# Patient Record
Sex: FEMALE | Race: WHITE | NOT HISPANIC OR LATINO | URBAN - METROPOLITAN AREA
[De-identification: names, ages, dates, MRNs, and addresses within clinical notes are randomized per-mention and may not be internally consistent; named-entity substitution may affect disease eponyms.]

---

## 2020-05-27 ENCOUNTER — INPATIENT (INPATIENT)
Facility: HOSPITAL | Age: 55
LOS: 0 days | Discharge: LEFT BEFORE DISCHARGE | End: 2020-05-28
Attending: HOSPITALIST | Admitting: HOSPITALIST
Payer: COMMERCIAL

## 2020-05-27 VITALS
HEIGHT: 59 IN | SYSTOLIC BLOOD PRESSURE: 126 MMHG | WEIGHT: 145.06 LBS | OXYGEN SATURATION: 99 % | TEMPERATURE: 97 F | RESPIRATION RATE: 18 BRPM | HEART RATE: 111 BPM | DIASTOLIC BLOOD PRESSURE: 65 MMHG

## 2020-05-27 DIAGNOSIS — Z98.891 HISTORY OF UTERINE SCAR FROM PREVIOUS SURGERY: Chronic | ICD-10-CM

## 2020-05-27 LAB
ALBUMIN SERPL ELPH-MCNC: 4.7 G/DL — SIGNIFICANT CHANGE UP (ref 3.5–5.2)
ALP SERPL-CCNC: 76 U/L — SIGNIFICANT CHANGE UP (ref 30–115)
ALT FLD-CCNC: 17 U/L — SIGNIFICANT CHANGE UP (ref 0–41)
ANION GAP SERPL CALC-SCNC: 14 MMOL/L — SIGNIFICANT CHANGE UP (ref 7–14)
AST SERPL-CCNC: 20 U/L — SIGNIFICANT CHANGE UP (ref 0–41)
BASOPHILS # BLD AUTO: 0.04 K/UL — SIGNIFICANT CHANGE UP (ref 0–0.2)
BASOPHILS NFR BLD AUTO: 0.5 % — SIGNIFICANT CHANGE UP (ref 0–1)
BILIRUB SERPL-MCNC: 0.2 MG/DL — SIGNIFICANT CHANGE UP (ref 0.2–1.2)
BUN SERPL-MCNC: 20 MG/DL — SIGNIFICANT CHANGE UP (ref 10–20)
CALCIUM SERPL-MCNC: 10 MG/DL — SIGNIFICANT CHANGE UP (ref 8.5–10.1)
CHLORIDE SERPL-SCNC: 105 MMOL/L — SIGNIFICANT CHANGE UP (ref 98–110)
CO2 SERPL-SCNC: 22 MMOL/L — SIGNIFICANT CHANGE UP (ref 17–32)
CREAT SERPL-MCNC: 0.8 MG/DL — SIGNIFICANT CHANGE UP (ref 0.7–1.5)
EOSINOPHIL # BLD AUTO: 0.16 K/UL — SIGNIFICANT CHANGE UP (ref 0–0.7)
EOSINOPHIL NFR BLD AUTO: 2 % — SIGNIFICANT CHANGE UP (ref 0–8)
GLUCOSE SERPL-MCNC: 97 MG/DL — SIGNIFICANT CHANGE UP (ref 70–99)
HCT VFR BLD CALC: 42.1 % — SIGNIFICANT CHANGE UP (ref 37–47)
HGB BLD-MCNC: 14.7 G/DL — SIGNIFICANT CHANGE UP (ref 12–16)
IMM GRANULOCYTES NFR BLD AUTO: 0.4 % — HIGH (ref 0.1–0.3)
LYMPHOCYTES # BLD AUTO: 1.75 K/UL — SIGNIFICANT CHANGE UP (ref 1.2–3.4)
LYMPHOCYTES # BLD AUTO: 22.3 % — SIGNIFICANT CHANGE UP (ref 20.5–51.1)
MAGNESIUM SERPL-MCNC: 2.4 MG/DL — SIGNIFICANT CHANGE UP (ref 1.8–2.4)
MCHC RBC-ENTMCNC: 32.5 PG — HIGH (ref 27–31)
MCHC RBC-ENTMCNC: 34.9 G/DL — SIGNIFICANT CHANGE UP (ref 32–37)
MCV RBC AUTO: 92.9 FL — SIGNIFICANT CHANGE UP (ref 81–99)
MONOCYTES # BLD AUTO: 0.43 K/UL — SIGNIFICANT CHANGE UP (ref 0.1–0.6)
MONOCYTES NFR BLD AUTO: 5.5 % — SIGNIFICANT CHANGE UP (ref 1.7–9.3)
NEUTROPHILS # BLD AUTO: 5.43 K/UL — SIGNIFICANT CHANGE UP (ref 1.4–6.5)
NEUTROPHILS NFR BLD AUTO: 69.3 % — SIGNIFICANT CHANGE UP (ref 42.2–75.2)
NRBC # BLD: 0 /100 WBCS — SIGNIFICANT CHANGE UP (ref 0–0)
NT-PROBNP SERPL-SCNC: 135 PG/ML — SIGNIFICANT CHANGE UP (ref 0–300)
PLATELET # BLD AUTO: 323 K/UL — SIGNIFICANT CHANGE UP (ref 130–400)
POTASSIUM SERPL-MCNC: 4.7 MMOL/L — SIGNIFICANT CHANGE UP (ref 3.5–5)
POTASSIUM SERPL-SCNC: 4.7 MMOL/L — SIGNIFICANT CHANGE UP (ref 3.5–5)
PROT SERPL-MCNC: 7.2 G/DL — SIGNIFICANT CHANGE UP (ref 6–8)
RBC # BLD: 4.53 M/UL — SIGNIFICANT CHANGE UP (ref 4.2–5.4)
RBC # FLD: 11.4 % — LOW (ref 11.5–14.5)
SODIUM SERPL-SCNC: 141 MMOL/L — SIGNIFICANT CHANGE UP (ref 135–146)
TROPONIN T SERPL-MCNC: <0.01 NG/ML — SIGNIFICANT CHANGE UP
WBC # BLD: 7.84 K/UL — SIGNIFICANT CHANGE UP (ref 4.8–10.8)
WBC # FLD AUTO: 7.84 K/UL — SIGNIFICANT CHANGE UP (ref 4.8–10.8)

## 2020-05-27 PROCEDURE — 93010 ELECTROCARDIOGRAM REPORT: CPT

## 2020-05-27 PROCEDURE — 71046 X-RAY EXAM CHEST 2 VIEWS: CPT | Mod: 26

## 2020-05-27 PROCEDURE — 99223 1ST HOSP IP/OBS HIGH 75: CPT | Mod: AI

## 2020-05-27 PROCEDURE — 99285 EMERGENCY DEPT VISIT HI MDM: CPT

## 2020-05-27 RX ORDER — ACETAMINOPHEN 500 MG
650 TABLET ORAL EVERY 6 HOURS
Refills: 0 | Status: DISCONTINUED | OUTPATIENT
Start: 2020-05-27 | End: 2020-05-28

## 2020-05-27 RX ORDER — CHLORHEXIDINE GLUCONATE 213 G/1000ML
1 SOLUTION TOPICAL
Refills: 0 | Status: DISCONTINUED | OUTPATIENT
Start: 2020-05-27 | End: 2020-05-28

## 2020-05-27 RX ORDER — METHYLPHENIDATE HCL 5 MG
0 TABLET ORAL
Qty: 0 | Refills: 0 | DISCHARGE

## 2020-05-27 RX ORDER — LEVALBUTEROL 1.25 MG/.5ML
2 SOLUTION, CONCENTRATE RESPIRATORY (INHALATION)
Qty: 0 | Refills: 0 | DISCHARGE

## 2020-05-27 RX ORDER — VENLAFAXINE HCL 75 MG
150 CAPSULE, EXT RELEASE 24 HR ORAL
Qty: 0 | Refills: 0 | DISCHARGE

## 2020-05-27 RX ORDER — MAGNESIUM SULFATE 500 MG/ML
2 VIAL (ML) INJECTION ONCE
Refills: 0 | Status: COMPLETED | OUTPATIENT
Start: 2020-05-27 | End: 2020-05-27

## 2020-05-27 RX ORDER — VENLAFAXINE HCL 75 MG
0 CAPSULE, EXT RELEASE 24 HR ORAL
Qty: 0 | Refills: 0 | DISCHARGE

## 2020-05-27 RX ORDER — VENLAFAXINE HCL 75 MG
150 CAPSULE, EXT RELEASE 24 HR ORAL
Refills: 0 | Status: DISCONTINUED | OUTPATIENT
Start: 2020-05-27 | End: 2020-05-28

## 2020-05-27 RX ORDER — METHYLPHENIDATE HCL 5 MG
1 TABLET ORAL
Qty: 0 | Refills: 0 | DISCHARGE

## 2020-05-27 RX ORDER — ENOXAPARIN SODIUM 100 MG/ML
40 INJECTION SUBCUTANEOUS AT BEDTIME
Refills: 0 | Status: DISCONTINUED | OUTPATIENT
Start: 2020-05-27 | End: 2020-05-28

## 2020-05-27 RX ORDER — VENLAFAXINE HCL 75 MG
150 CAPSULE, EXT RELEASE 24 HR ORAL
Refills: 0 | Status: DISCONTINUED | OUTPATIENT
Start: 2020-05-27 | End: 2020-05-27

## 2020-05-27 RX ORDER — SODIUM CHLORIDE 9 MG/ML
1000 INJECTION INTRAMUSCULAR; INTRAVENOUS; SUBCUTANEOUS ONCE
Refills: 0 | Status: COMPLETED | OUTPATIENT
Start: 2020-05-27 | End: 2020-05-27

## 2020-05-27 RX ORDER — FEXOFENADINE HCL 30 MG
1 TABLET ORAL
Qty: 0 | Refills: 0 | DISCHARGE

## 2020-05-27 RX ADMIN — Medication 50 GRAM(S): at 19:19

## 2020-05-27 RX ADMIN — SODIUM CHLORIDE 1000 MILLILITER(S): 9 INJECTION INTRAMUSCULAR; INTRAVENOUS; SUBCUTANEOUS at 19:19

## 2020-05-27 RX ADMIN — ENOXAPARIN SODIUM 40 MILLIGRAM(S): 100 INJECTION SUBCUTANEOUS at 22:31

## 2020-05-27 NOTE — H&P ADULT - NSICDXFAMILYHX_GEN_ALL_CORE_FT
FAMILY HISTORY:  FH: CAD (coronary artery disease), Sister MI at the age of 52  FH: type 2 diabetes, Sister

## 2020-05-27 NOTE — H&P ADULT - ATTENDING COMMENTS
56 YO F with a PMH of SVT, ADHD, and sciatica who presents to the hospital with a c/o palpitations for the past x 4 days. Denies any CP, SOB, LE swelling, or fevers/chills. In the ED, HR in the 110's and an EKG showed QTc > 600 (corrected was < 450). Given IV Mg2+.     Physical exam shows pt in NAD. VSS, afebrile, not hypoxic on RA. A&Ox3. Non-focal neuro exam. Muscle strength/sensation intact. CTA B/L with no W/C/R. RRR, no M/G/R. ABD is soft and non-tender, normoactive BSs. LEs without swelling. No rashes. Labs and radiology as above. QTc < 450    Palpitations with hx of SVT. Tele. Echo. TSH. Cardio consult. IVFs (LR). Serial cardiac enzymes and EKGs.     HX of SVT, ADHD, and sciatica. Restart home meds, hold ADHD meds. GI and DVT PPX. Inform PCP of pt's admission to hospital. Rest as per above note.

## 2020-05-27 NOTE — ED PROVIDER NOTE - NS ED ROS FT
Review of Systems         Constitutional: (-) fever (-) chills (+) weakness       EENT:  (-) sore throat (-) congestion       Cardiovascular: (-) chest pain (-) syncope       Respiratory: (-) cough, (-) shortness of breath       Gastrointestinal: (-) abdominal pain (-) vomiting (-) diarrhea (-) nausea (-) constipation       Genitourinary: (-) dysuria (-) frequency (-) hematuria       Musculoskeletal: (-) neck pain (-) back pain (-) joint pain       Integumentary: (-) rash       Neurological: (-) headache (-) altered mental status (-) dizziness (-) paresthesias       Psych: (-) psych history

## 2020-05-27 NOTE — H&P ADULT - ASSESSMENT
Ms. Ervin is a 55 year old female patient with PMH of SVT, ADHD, and sciatica presenting tonight to ED due to palpitations lasting for 4 hours.    # Palpitations  # History of supraventricular tachycardia   - ECG: sinus tachycardia, QTc: 639  - M.4, K: 4.7  - on methylphenidate for a long time, increase in methylphenidate dose to 54 mg, 6 months ago. Methylphenidate can cause tachyarrhythmia, also report of QTc prolongation.   - Telemonitoring  - Will repeat electrolytes (K, Mg)  - Will check TSH  - Will obtain an evaluation by Dr. Ballesteros    # ADHD  - will hold off methylphenidate, patient takes it only when she is working.   - continue venlafaxine 150 mg BID Ms. Ervin is a 55 year old female patient with PMH of SVT, ADHD, and sciatica presenting tonight to ED due to palpitations lasting for 4 hours.    # Palpitations  # History of supraventricular tachycardia   - ECG: sinus tachycardia, QTc: 445  - M.4, K: 4.7  - on methylphenidate for a long time, increase in methylphenidate dose to 54 mg, 6 months ago. Methylphenidate can cause tachyarrhythmia, also report of QTc prolongation.   - Telemonitoring  - Will repeat electrolytes (K, Mg)  - Will check TSH  - Will obtain an evaluation by Dr. Ballesteros    # ADHD  - will hold off methylphenidate, patient takes it only when she is working.   - continue venlafaxine 150 mg BID

## 2020-05-27 NOTE — H&P ADULT - HISTORY OF PRESENT ILLNESS
Ms. Ervin is a 55 year old female patient with PMH of SVT, ADHD, and sciatica presenting tonight to ED due to palpitations lasting for 4 hours.    Patient states for 4 hours she began having palpitations while at work and her HR was ranging between 120-180. No chest pain, shortness of breath but does admit to weakness. Denies fever, abd pain, n/v/diaphoresis. Patient on effexor and concerta. Managed by Dr. Ballesteros and is not on any meds for her SVT. Initial episode was in 2013 and required adenosine. She reports last seen by Dr. Ballesteros a month ago, and at that time ECG was normal, and the plan was to get echo stress test in July. Patient is also menopausal, and feels her palpitations are due to that. Of note, patient has been on methylphenidate for a long time, that she takes in the morning, only when she is at work, recent increase in methylphenidate dose to 54 mg 6 months ago. She reports monitoring caffeine intake, but drank a lot of iced tea yesterday.     In ED, patient was tachycardic 115 (sinus tachycardia), QTc prolonged: 639. Patient was given Magnesium, and will be admitted to telemetry for monitoring.

## 2020-05-27 NOTE — ED ADULT TRIAGE NOTE - CHIEF COMPLAINT QUOTE
Patient with hx of SVT c/o palpitation and elevated HR x 4 hours. Patient denies SOB fevers and Cough.

## 2020-05-27 NOTE — ED PROVIDER NOTE - CLINICAL SUMMARY MEDICAL DECISION MAKING FREE TEXT BOX
56yo F history of SVT followed by Dr. Ballesteros presenting with palpitations x4hrs, now resolved. Per pt, noted her HR on her watch from 120-180bpm. Now asymptomatic. No recent illness, fevers/chills, nausea/vomiting/diarrhea, chest pain, shortness of breath, abdominal pain. labs ekg imaging reviewed. +prolonged qt. mag given. will admit.

## 2020-05-27 NOTE — H&P ADULT - NSHPLABSRESULTS_GEN_ALL_CORE
Labs:                            14.7   7.84  )-----------( 323      ( 27 May 2020 18:55 )             42.1       05-27    141  |  105  |  20  ----------------------------<  97  4.7   |  22  |  0.8    Ca    10.0      27 May 2020 18:55  Mg     2.4     05-27    TPro  7.2  /  Alb  4.7  /  TBili  0.2  /  DBili  x   /  AST  20  /  ALT  17  /  AlkPhos  76  05-27            Lactate Trend      CARDIAC MARKERS ( 27 May 2020 18:55 )  x     / <0.01 ng/mL / x     / x     / x              < from: 12 Lead ECG (05.27.20 @ 18:14) >    Diagnosis Line Sinus rhythm  Nonspecific ST abnormality  Prolonged QT  Abnormal ECG    < end of copied text >

## 2020-05-27 NOTE — ED PROVIDER NOTE - OBJECTIVE STATEMENT
55 year old female hx SVT, sciatica p/w palpitations x 4 hours. Patient states for 4 hours she began having palpitations while at work and her HR was ranging between 120-180. No chest pain, shortness of breath but does admit to weakness. Denies abd pain, n/v/diaphoresis. Patient on effexor and concerta. Managed by dr enriquez and is not on any meds for her svt. last episode was in 2013 and required adenosine.

## 2020-05-27 NOTE — ED PROVIDER NOTE - PHYSICAL EXAMINATION
Physical Exam    Vital Signs: I have reviewed the initial vital signs  Constitutional: well-nourished, appears stated age, no acute distress  EENT: Conjunctiva pink, Sclera clear, PERRLA, EOMI. Mucous membranes moist, no exudates or lesions noted, uvula midline.  Cardiovascular: S1 and S2 present, tachycardic, regular rhythm. Well perfused extremities, no peripheral edema  Respiratory: unlabored respiratory effort, clear to auscultation bilaterally no wheezing, rales or rhonchi  Gastrointestinal: soft, non-tender abdomen. No guarding or rebound tenderness  Musculoskeletal: supple nontender neck, no midline tenderness, no joint pain  Integumentary: warm, dry, no rash  Psychiatric: appropriate mood, appropriate affect

## 2020-05-27 NOTE — ED ADULT NURSE NOTE - OBJECTIVE STATEMENT
pt started c/o palpitations since 1430 for 4 hours. now resolved. denies any chest pain, sob, dizziness.

## 2020-05-27 NOTE — H&P ADULT - NSHPPHYSICALEXAM_GEN_ALL_CORE
From: Jolene Gomes  To: Peri Ramon DO  Sent: 5/20/2020 3:00 PM CDT  Subject: Medication Question    I have enough 3.125 mg Carvedilol for a week. The prescription has 0 refills.     My May 7 blood test reading was 9.0. I had a blood test today.     My blood pressure readings for the past two weeks are:    May 7 111/64 124/66    May 8 129/68.  99/65    May 9 150/70 101/70    May 10 120/67    May 11 115/79    May 12 107/63 115/77    May 13. 103/63 97/66    May 14. 111/69    May 15. 117/73. 134/77    May 17. 115/64    May 18. 101/74. 134/71    May 19. 81/56. 105/61    May 20. 128/72. 114/88    Please advise as to the next prescription of Carvedilol and whether I should resume taking an aspirin.    Jolene Gomes   Vital Signs Last 24 Hrs  T(C): 36.3 (27 May 2020 18:12), Max: 36.3 (27 May 2020 18:12)  T(F): 97.3 (27 May 2020 18:12), Max: 97.3 (27 May 2020 18:12)  HR: 111 (27 May 2020 18:12) (111 - 111)  BP: 126/65 (27 May 2020 18:12) (126/65 - 126/65)  RR: 18 (27 May 2020 18:12) (18 - 18)  SpO2: 99% (27 May 2020 18:12) (99% - 99%)      PHYSICAL EXAM:  GENERAL: NAD, speaks in full sentences, no signs of respiratory distress  HEAD:  Atraumatic, Normocephalic  EYES: conjunctiva and sclera clear  NECK: Supple  CHEST/LUNG: Clear to auscultation bilaterally; No wheeze; No crackles; No accessory muscles used  HEART: Regular rate and rhythm; No murmurs;   ABDOMEN: Soft, Nontender, Nondistended; No guarding  EXTREMITIES:  2+ Peripheral Pulses, No cyanosis or edema  PSYCH: AAOx3  NEUROLOGY: non-focal  SKIN: No rashes or lesions

## 2020-05-27 NOTE — ED PROVIDER NOTE - ATTENDING CONTRIBUTION TO CARE
56yo F history of SVT followed by Dr. Ballesteros presenting with palpitations x4hrs, now resolved. Per pt, noted her HR on her watch from 120-180bpm. Now asymptomatic. No recent illness, fevers/chills, nausea/vomiting/diarrhea, chest pain, shortness of breath, abdominal pain.   Constitutional: Well appearing. No acute distress. Non toxic.   Eyes: PERRLA. Extraocular movements intact, no entrapment. Conjunctiva normal.   ENT: No nasal discharge. Moist mucus membranes.  Neck: Supple, non tender, full range of motion.  CV: RRR no murmurs, rubs, or gallops. +S1S2.   Pulm: Clear to auscultation bilaterally. Normal work of breathing.  Abd: soft NT ND +BS.   Ext: Warm and well perfused x4, moving all extremities, no edema.   Psy: Cooperative, appropriate.   Skin: Warm, dry, no rash  Neuro: CN2-12 grossly intact no sensory or motor deficits throughout, no drift, no ataxia

## 2020-05-28 ENCOUNTER — TRANSCRIPTION ENCOUNTER (OUTPATIENT)
Age: 55
End: 2020-05-28

## 2020-05-28 VITALS
TEMPERATURE: 98 F | DIASTOLIC BLOOD PRESSURE: 70 MMHG | RESPIRATION RATE: 17 BRPM | HEART RATE: 62 BPM | SYSTOLIC BLOOD PRESSURE: 143 MMHG | OXYGEN SATURATION: 98 %

## 2020-05-28 LAB
ALBUMIN SERPL ELPH-MCNC: 4.3 G/DL — SIGNIFICANT CHANGE UP (ref 3.5–5.2)
ALP SERPL-CCNC: 73 U/L — SIGNIFICANT CHANGE UP (ref 30–115)
ALT FLD-CCNC: 16 U/L — SIGNIFICANT CHANGE UP (ref 0–41)
ANION GAP SERPL CALC-SCNC: 13 MMOL/L — SIGNIFICANT CHANGE UP (ref 7–14)
AST SERPL-CCNC: 19 U/L — SIGNIFICANT CHANGE UP (ref 0–41)
BASOPHILS # BLD AUTO: 0.05 K/UL — SIGNIFICANT CHANGE UP (ref 0–0.2)
BASOPHILS NFR BLD AUTO: 0.7 % — SIGNIFICANT CHANGE UP (ref 0–1)
BILIRUB SERPL-MCNC: 0.3 MG/DL — SIGNIFICANT CHANGE UP (ref 0.2–1.2)
BUN SERPL-MCNC: 17 MG/DL — SIGNIFICANT CHANGE UP (ref 10–20)
CALCIUM SERPL-MCNC: 9.2 MG/DL — SIGNIFICANT CHANGE UP (ref 8.5–10.1)
CHLORIDE SERPL-SCNC: 103 MMOL/L — SIGNIFICANT CHANGE UP (ref 98–110)
CO2 SERPL-SCNC: 24 MMOL/L — SIGNIFICANT CHANGE UP (ref 17–32)
CREAT SERPL-MCNC: 0.8 MG/DL — SIGNIFICANT CHANGE UP (ref 0.7–1.5)
EOSINOPHIL # BLD AUTO: 0.18 K/UL — SIGNIFICANT CHANGE UP (ref 0–0.7)
EOSINOPHIL NFR BLD AUTO: 2.7 % — SIGNIFICANT CHANGE UP (ref 0–8)
GLUCOSE SERPL-MCNC: 90 MG/DL — SIGNIFICANT CHANGE UP (ref 70–99)
HCT VFR BLD CALC: 40.9 % — SIGNIFICANT CHANGE UP (ref 37–47)
HCV AB S/CO SERPL IA: 0.03 COI — SIGNIFICANT CHANGE UP
HCV AB SERPL-IMP: SIGNIFICANT CHANGE UP
HGB BLD-MCNC: 13.3 G/DL — SIGNIFICANT CHANGE UP (ref 12–16)
IMM GRANULOCYTES NFR BLD AUTO: 0.1 % — SIGNIFICANT CHANGE UP (ref 0.1–0.3)
LYMPHOCYTES # BLD AUTO: 2.52 K/UL — SIGNIFICANT CHANGE UP (ref 1.2–3.4)
LYMPHOCYTES # BLD AUTO: 37.6 % — SIGNIFICANT CHANGE UP (ref 20.5–51.1)
MAGNESIUM SERPL-MCNC: 2.8 MG/DL — HIGH (ref 1.8–2.4)
MCHC RBC-ENTMCNC: 30.8 PG — SIGNIFICANT CHANGE UP (ref 27–31)
MCHC RBC-ENTMCNC: 32.5 G/DL — SIGNIFICANT CHANGE UP (ref 32–37)
MCV RBC AUTO: 94.7 FL — SIGNIFICANT CHANGE UP (ref 81–99)
MONOCYTES # BLD AUTO: 0.47 K/UL — SIGNIFICANT CHANGE UP (ref 0.1–0.6)
MONOCYTES NFR BLD AUTO: 7 % — SIGNIFICANT CHANGE UP (ref 1.7–9.3)
NEUTROPHILS # BLD AUTO: 3.48 K/UL — SIGNIFICANT CHANGE UP (ref 1.4–6.5)
NEUTROPHILS NFR BLD AUTO: 51.9 % — SIGNIFICANT CHANGE UP (ref 42.2–75.2)
NRBC # BLD: 0 /100 WBCS — SIGNIFICANT CHANGE UP (ref 0–0)
PLATELET # BLD AUTO: 320 K/UL — SIGNIFICANT CHANGE UP (ref 130–400)
POTASSIUM SERPL-MCNC: 4.7 MMOL/L — SIGNIFICANT CHANGE UP (ref 3.5–5)
POTASSIUM SERPL-SCNC: 4.7 MMOL/L — SIGNIFICANT CHANGE UP (ref 3.5–5)
PROT SERPL-MCNC: 6.8 G/DL — SIGNIFICANT CHANGE UP (ref 6–8)
RBC # BLD: 4.32 M/UL — SIGNIFICANT CHANGE UP (ref 4.2–5.4)
RBC # FLD: 11.6 % — SIGNIFICANT CHANGE UP (ref 11.5–14.5)
SARS-COV-2 RNA SPEC QL NAA+PROBE: SIGNIFICANT CHANGE UP
SODIUM SERPL-SCNC: 140 MMOL/L — SIGNIFICANT CHANGE UP (ref 135–146)
TROPONIN T SERPL-MCNC: <0.01 NG/ML — SIGNIFICANT CHANGE UP
TSH SERPL-MCNC: 1.97 UIU/ML — SIGNIFICANT CHANGE UP (ref 0.27–4.2)
WBC # BLD: 6.71 K/UL — SIGNIFICANT CHANGE UP (ref 4.8–10.8)
WBC # FLD AUTO: 6.71 K/UL — SIGNIFICANT CHANGE UP (ref 4.8–10.8)

## 2020-05-28 PROCEDURE — 99238 HOSP IP/OBS DSCHRG MGMT 30/<: CPT

## 2020-05-28 NOTE — DISCHARGE NOTE PROVIDER - NSDCCPCAREPLAN_GEN_ALL_CORE_FT
PRINCIPAL DISCHARGE DIAGNOSIS  Diagnosis: Palpitations  Assessment and Plan of Treatment: you most likely had a supraventricular tachycardia. Work up was not completed to evaluate the cause. You should discuss with your physician if your ADHD medication is one of the causes

## 2020-05-28 NOTE — DISCHARGE NOTE PROVIDER - HOSPITAL COURSE
Ms. Ervin is a 55 year old female patient with PMH of SVT, ADHD, and sciatica presenting tonight to ED due to palpitations lasting for 4 hours.        # Palpitations    # History of supraventricular tachycardia     - ECG: sinus tachycardia, QTc: 445    - M.4, K: 4.7    - on methylphenidate for a long time, increase in methylphenidate dose to 54 mg, 6 months ago. Methylphenidate can cause tachyarrhythmia, also report of QTc prolongation.     Patient was admitted under telemetry, with evaluation by cardiology requested.     Around 5 am, patient decided to leave AMA, as she is afraid she will catch COVID in the hallway.

## 2020-05-28 NOTE — DISCHARGE NOTE PROVIDER - CARE PROVIDER_API CALL
Larry Ballesteros  Cardiovascular Disease  [address] [facility] [address]  [place] [address]  Phone: [phone]  Fax: [phone]  Follow Up Time: 1-3 days

## 2020-05-28 NOTE — DISCHARGE NOTE PROVIDER - NSDCMRMEDTOKEN_GEN_ALL_CORE_FT
Allegra 24 Hour Allergy oral tablet: 1 tab(s) orally once a day  Concerta 54 mg/24 hr oral tablet, extended release: 1 tab(s) orally once a day (in the morning)  Effexor 100 mg oral tablet: 150 milligram(s) orally 2 times a day  Xopenex HFA 45 mcg/inh inhalation aerosol: 2 puff(s) inhaled every 4 hours, As Needed

## 2020-05-29 ENCOUNTER — TRANSCRIPTION ENCOUNTER (OUTPATIENT)
Age: 55
End: 2020-05-29

## 2020-05-31 DIAGNOSIS — R00.2 PALPITATIONS: ICD-10-CM

## 2020-05-31 DIAGNOSIS — Z78.0 ASYMPTOMATIC MENOPAUSAL STATE: ICD-10-CM

## 2020-05-31 DIAGNOSIS — I47.1 SUPRAVENTRICULAR TACHYCARDIA: ICD-10-CM

## 2020-05-31 DIAGNOSIS — F90.9 ATTENTION-DEFICIT HYPERACTIVITY DISORDER, UNSPECIFIED TYPE: ICD-10-CM

## 2021-06-22 PROBLEM — I47.1 SUPRAVENTRICULAR TACHYCARDIA: Chronic | Status: ACTIVE | Noted: 2020-05-27

## 2021-06-22 PROBLEM — F90.9 ATTENTION-DEFICIT HYPERACTIVITY DISORDER, UNSPECIFIED TYPE: Chronic | Status: ACTIVE | Noted: 2020-05-27

## 2021-08-24 ENCOUNTER — NON-APPOINTMENT (OUTPATIENT)
Age: 56
End: 2021-08-24

## 2021-08-24 PROBLEM — Z00.00 ENCOUNTER FOR PREVENTIVE HEALTH EXAMINATION: Status: ACTIVE | Noted: 2021-08-24

## 2021-08-27 ENCOUNTER — APPOINTMENT (OUTPATIENT)
Dept: PLASTIC SURGERY | Facility: CLINIC | Age: 56
End: 2021-08-27
Payer: COMMERCIAL

## 2021-08-27 VITALS — WEIGHT: 158 LBS | HEIGHT: 69 IN | BODY MASS INDEX: 23.4 KG/M2

## 2021-08-27 DIAGNOSIS — Z87.39 PERSONAL HISTORY OF OTHER DISEASES OF THE MUSCULOSKELETAL SYSTEM AND CONNECTIVE TISSUE: ICD-10-CM

## 2021-08-27 DIAGNOSIS — Z86.79 PERSONAL HISTORY OF OTHER DISEASES OF THE CIRCULATORY SYSTEM: ICD-10-CM

## 2021-08-27 DIAGNOSIS — Z87.891 PERSONAL HISTORY OF NICOTINE DEPENDENCE: ICD-10-CM

## 2021-08-27 DIAGNOSIS — Z86.59 PERSONAL HISTORY OF OTHER MENTAL AND BEHAVIORAL DISORDERS: ICD-10-CM

## 2021-08-27 DIAGNOSIS — Z78.9 OTHER SPECIFIED HEALTH STATUS: ICD-10-CM

## 2021-08-27 DIAGNOSIS — Z86.39 PERSONAL HISTORY OF OTHER ENDOCRINE, NUTRITIONAL AND METABOLIC DISEASE: ICD-10-CM

## 2021-08-27 PROCEDURE — 99203 OFFICE O/P NEW LOW 30 MIN: CPT

## 2021-08-27 NOTE — ASSESSMENT
[FreeTextEntry1] : 55 y/o F with soft tissue prominence to central upper back\par \par diffuse excess fatty tissue upper back\par \par prior MRI does not show a discreet lipoma with a capsule\par \par best to treat w liposuction but insurance may not approve\par \par Regarding the procedure, we discussed scarring, poor wound healing, bleeding, infection, need for additional surgery, and dissatisfaction with the outcome.  Also discussed possibility of keloid and/or hypertrophic scar formation as well as recurrence.  All questions were answered and risks understood.\par \par Due to COVID 19, pre-visit patient instructions were explained to the patient and their symptoms were checked upon arrival.  \par Masks were used by the health care providers and staff and the examination room was cleaned after the patient visit was completed.\par \par \par [place] try to get insuracne approval for liposuction, otherwise direct excision\par \par

## 2021-08-27 NOTE — PHYSICAL EXAM
[de-identified] : well-appearing, NAD [de-identified] : Upper central back with 11x6 cm area of soft tissue prominence, nontender, no open wounds

## 2021-08-27 NOTE — HISTORY OF PRESENT ILLNESS
[FreeTextEntry1] : Pt is a 55 y/o F with PMH of paroxysmal SVT s/p cardioversion, HLD, fibromyalgia, and depression who presents for evaluation of posterior neck lipoma since 2018 with progressive growth. Pt was initially treated for infection with no improvement. Denies previous h/o skin cancer but reports small lipomas to anterior neck.\par \par Reports h/o disc degeneration and foraminal narrowing.\par \par MRI neck 10/23/18: no evidence of dominant mass or LAD in the neck. Prominence of the fat in the posterior subcutaneous soft tissue between the marker is noted however no evidence of discrete lipoma or mass is seen.\par US 3/17/20: the posterior neck soft tissue abnormality consists of fat without evidence for a discrete lipoma.\par \par Quit smoking 32 years ago\par Occ: Pediatric RN

## 2021-11-29 ENCOUNTER — APPOINTMENT (OUTPATIENT)
Dept: PLASTIC SURGERY | Facility: AMBULATORY SURGERY CENTER | Age: 56
End: 2021-11-29

## 2021-12-06 ENCOUNTER — APPOINTMENT (OUTPATIENT)
Dept: PLASTIC SURGERY | Facility: CLINIC | Age: 56
End: 2021-12-06

## 2023-03-01 NOTE — ED ADULT NURSE NOTE - NS ED NURSE LEVEL OF CONSCIOUSNESS ORIENTATION
Oriented - self; Oriented - place; Oriented - time Full Thickness Lip Wedge Repair (Flap) Text: Given the location of the defect and the proximity to free margins a full thickness wedge repair was deemed most appropriate.  Using a sterile surgical marker, the appropriate repair was drawn incorporating the defect and placing the expected incisions perpendicular to the vermilion border.  The vermilion border was also meticulously outlined to ensure appropriate reapproximation during the repair.  The area thus outlined was incised through and through with a #15 scalpel blade.  The muscularis and dermis were reaproximated with deep sutures following hemostasis. Care was taken to realign the vermilion border before proceeding with the superficial closure.  Once the vermilion was realigned the superfical and mucosal closure was finished.

## 2023-12-03 ENCOUNTER — EMERGENCY (EMERGENCY)
Facility: HOSPITAL | Age: 58
LOS: 0 days | Discharge: HOME | End: 2023-12-03
Attending: EMERGENCY MEDICINE
Payer: COMMERCIAL

## 2023-12-03 VITALS
OXYGEN SATURATION: 98 % | TEMPERATURE: 98 F | DIASTOLIC BLOOD PRESSURE: 68 MMHG | HEART RATE: 84 BPM | RESPIRATION RATE: 18 BRPM | WEIGHT: 149.03 LBS | SYSTOLIC BLOOD PRESSURE: 154 MMHG

## 2023-12-03 DIAGNOSIS — R00.2 PALPITATIONS: ICD-10-CM

## 2023-12-03 DIAGNOSIS — F32.A DEPRESSION, UNSPECIFIED: ICD-10-CM

## 2023-12-03 DIAGNOSIS — F90.9 ATTENTION-DEFICIT HYPERACTIVITY DISORDER, UNSPECIFIED TYPE: ICD-10-CM

## 2023-12-03 DIAGNOSIS — Z86.79 PERSONAL HISTORY OF OTHER DISEASES OF THE CIRCULATORY SYSTEM: ICD-10-CM

## 2023-12-03 DIAGNOSIS — R20.2 PARESTHESIA OF SKIN: ICD-10-CM

## 2023-12-03 DIAGNOSIS — Z91.040 LATEX ALLERGY STATUS: ICD-10-CM

## 2023-12-03 DIAGNOSIS — F41.9 ANXIETY DISORDER, UNSPECIFIED: ICD-10-CM

## 2023-12-03 DIAGNOSIS — Z88.8 ALLERGY STATUS TO OTHER DRUGS, MEDICAMENTS AND BIOLOGICAL SUBSTANCES: ICD-10-CM

## 2023-12-03 DIAGNOSIS — Z98.891 HISTORY OF UTERINE SCAR FROM PREVIOUS SURGERY: Chronic | ICD-10-CM

## 2023-12-03 PROCEDURE — 99283 EMERGENCY DEPT VISIT LOW MDM: CPT | Mod: 25

## 2023-12-03 PROCEDURE — 93010 ELECTROCARDIOGRAM REPORT: CPT

## 2023-12-03 PROCEDURE — 93005 ELECTROCARDIOGRAM TRACING: CPT

## 2023-12-03 PROCEDURE — 99285 EMERGENCY DEPT VISIT HI MDM: CPT

## 2023-12-03 NOTE — ED PROVIDER NOTE - PATIENT PORTAL LINK FT
You can access the FollowMyHealth Patient Portal offered by [facility] by registering at the following website: [email or web address]. By joining [facility]’s FollowMyHealth portal, you will also be able to view your health information using other applications (apps) compatible with our system. You can access the FollowMyHealth Patient Portal offered by [facility] by registering at the following website: [email or web address]. By joining [employer]’s FollowMyHealth portal, you will also be able to view your health information using other applications (apps) compatible with our system.

## 2023-12-03 NOTE — ED PROVIDER NOTE - CARE PROVIDER_API CALL
Larry Ballesteros  Cardiovascular Disease  [address] [facility] [facility] [address]  [place] [address]  Phone: [phone]  Fax: [phone]  Follow Up Time:    Larry Ballesteros  Cardiovascular Disease  [address] [facility], [facility] [address]  [place] [address]  Phone: [phone]  Fax: [phone]  Follow Up Time:

## 2023-12-03 NOTE — ED PROVIDER NOTE - PHYSICAL EXAMINATION
CONSTITUTIONAL: Well-appearing; well-nourished; in no apparent distress.   EYES: PERRL; EOM intact.   ENT: normal nose; no rhinorrhea; normal pharynx with no tonsillar hypertrophy.   NECK: Supple; non-tender; no cervical lymphadenopathy.   CARDIOVASCULAR: Normal S1, S2; no murmurs, rubs, or gallops. Equal radial pulses  RESPIRATORY: Normal chest excursion with respiration; breath sounds clear and equal bilaterally; no wheezes, rhonchi, or rales.  GI/: Normal bowel sounds; non-distended; non-tender; no palpable organomegaly.   MS: No evidence of trauma or deformity. Normal ROM in all four extremities; non-tender to palpation; distal pulses are normal.   Extrem: no peripheral edema. No calf ttp  SKIN: Normal for age and race; warm; dry; good turgor; no apparent lesions or exudate.   NEURO/PSYCH: A & O x 4; grossly unremarkable. mood and manner are appropriate. Grooming and personal hygiene are appropriate.

## 2023-12-03 NOTE — ED PROVIDER NOTE - CARE PROVIDERS DIRECT ADDRESSES
[email or web address]

## 2023-12-03 NOTE — ED PROVIDER NOTE - ATTENDING APP SHARED VISIT CONTRIBUTION OF CARE
58yF depression SVT s/p ablation recently taken off her effexor 2/2 prolonged QTc now p/w inc anxiety and palpitations after recently starting cymbalta.  Pt worried about possible arrhythmia again.

## 2023-12-03 NOTE — ED PROVIDER NOTE - CLINICAL SUMMARY MEDICAL DECISION MAKING FREE TEXT BOX
58yF depression hx SVT s/p ablation p/w palpitations and inc anxiety after recent change in medications.  Pt hemodynamically stable and w/o resp distress.  EKG w/o ischemia or arrhythmia.  Offered pt further workup including bloodwork but pt declined.  D/w pt supportive care, o/p psych f/u for med titration, return precautions.

## 2023-12-03 NOTE — ED ADULT NURSE NOTE - NSFALLUNIVINTERV_ED_ALL_ED
Bed/Stretcher in lowest position, wheels locked, appropriate side rails in place/Call bell, personal items and telephone in reach/Instruct patient to call for assistance before getting out of bed/chair/stretcher/Non-slip footwear applied when patient is off stretcher/[place] to call system/Physically safe environment - no spills, clutter or unnecessary equipment/Purposeful proactive rounding/Room/bathroom lighting operational, light cord in reach Bed/Stretcher in lowest position, wheels locked, appropriate side rails in place/Call bell, personal items and telephone in reach/Instruct patient to call for assistance before getting out of bed/chair/stretcher/Non-slip footwear applied when patient is off stretcher/[place] to call system/Physically safe environment - no spills, clutter or unnecessary equipment/Purposeful proactive rounding/Room/bathroom lighting operational, light cord in reach

## 2023-12-03 NOTE — ED PROVIDER NOTE - OBJECTIVE STATEMENT
58-year-old female history of SVT status post ablation, depression, ADHD presented to ER for evaluation of palpitations.  Patient states for the past few months has been having intermittent palpitations, perioral paresthesias and paresthesias to bilateral hands.  Patient states she believes her symptoms are related to anxiety. Patient states has a history of prolonged QT and came to ER today to make sure symptoms are not due to prolonged qt. Patient denies any chest pain, shortness of breath, leg pain, ankle swelling, nausea, vomiting, diarrhea.

## 2023-12-03 NOTE — ED PROVIDER NOTE - NS_EDPROVIDERDISPOUSERTYPE_ED_A_ED
Curettage Text: The wound bed was treated with curettage after the biopsy was performed. Attending Attestation (For Attendings USE Only)...

## 2024-01-12 ENCOUNTER — APPOINTMENT (OUTPATIENT)
Dept: PSYCHIATRY | Facility: CLINIC | Age: 59
End: 2024-01-12

## 2024-01-12 ENCOUNTER — OUTPATIENT (OUTPATIENT)
Dept: OUTPATIENT SERVICES | Facility: HOSPITAL | Age: 59
LOS: 1 days | End: 2024-01-12
Payer: COMMERCIAL

## 2024-01-12 DIAGNOSIS — F33.3 MAJOR DEPRESSIVE DISORDER, RECURRENT, SEVERE WITH PSYCHOTIC SYMPTOMS: ICD-10-CM

## 2024-01-12 DIAGNOSIS — Z98.891 HISTORY OF UTERINE SCAR FROM PREVIOUS SURGERY: Chronic | ICD-10-CM

## 2024-01-12 PROCEDURE — 90791 PSYCH DIAGNOSTIC EVALUATION: CPT

## 2024-01-12 NOTE — REASON FOR VISIT
[Number can be texted] : number can be texted [OK  to leave message] : OK  to leave message [[facility] Provider/Facility] : [facility] Provider/Facility [Patient] : Patient [Prior Medical Records] : Prior Medical Records [FreeTextEntry4] : 3pm [FreeTextEntry3] : [email or web address] [FreeTextEntry5] : English [FreeTextEntry6] : Megan [FreeTextEntry7] : She [Medical/Surgical Unit] : Medical/Surgical Unit [FreeTextEntry2] : Evaluation for anxiety and depression [FreeTextEntry1] : Evaluation for anxiety and depression.

## 2024-01-12 NOTE — HEALTH RISK ASSESSMENT
[With Patient/Caregiver] : , with patient/caregiver [With Patient/Collateral] : , with patient/collateral [AdvancecareDate] : 1/12/24 [] : 1/12/24

## 2024-01-12 NOTE — PSYCHOSOCIAL ASSESSMENT
[None known] : None known [Other: _____] : [unfilled] [Yes (select details below)] : Have you ever experienced this type of event? Yes [tried hard not to think about the event(s) or went out of your way to avoid situations that reminded you of the event] : tried hard to avoid thinking about events or avoid situations that reminded patient of the event [has been constantly on guard, watchful, or easily startled] : has been constantly on guard, watchful, or easily startled [felt numb or detached from people, activities, or your surrounding] : has felt numb or detached from people, activities, or surroundings [felt guilty or unable to stop blaming yourself or others for the event(s) or any problems the event(s) may have caused] : has felt guilty or unable to stop blaming self or others for event(s), or any problems the event(s) may have caused [Competitive and integrated employment] : Competitive and integrated employment [15 - 34 hours] : 15 - 34 hours [Earned income] : earned income [Financially stable] : financially stable [None] : none [Client lives alone] : client lives alone [No] : Patient has personal representation (legal guardian, representative payee, conservatorship)? No [had nightmares about the event(s) or thought about the event(s) when you did not want] : did not have nightmares and/or unwanted thoughts about the events [Spouse/Partner] : spouse/partner [Child] : child [Good] : good [Frequent Contact] : frequent contact [FreeTextEntry7] : Teetee Manriquez [phone] [FreeTextEntry8] : jennifer Whaley [phone] [FreeTextEntry9] : [place] [phone]
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
## 2024-01-12 NOTE — FAMILY HISTORY
[FreeTextEntry1] : Family composition: Patient is a 58 y.o.  female, residing with her 2 adult sons, currently employed part-time.  Family history and background: Born and raised in [place].  Family relationship: [facility], MH and Substance Use History including Adult Child of Alcoholic and child of substance abuse status; history of cancer and heart disease: Sister: Schizophrenia 54 y/o schizophrenia, depression, Diabates, heart disease.  Mother: OCD, Anxiety depression 77 y/o. Pulmonary Fibrosis.
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
## 2024-01-13 DIAGNOSIS — F33.3 MAJOR DEPRESSIVE DISORDER, RECURRENT, SEVERE WITH PSYCHOTIC SYMPTOMS: ICD-10-CM

## 2024-01-22 ENCOUNTER — EMERGENCY (EMERGENCY)
Facility: HOSPITAL | Age: 59
LOS: 0 days | Discharge: HOME | End: 2024-01-23
Attending: EMERGENCY MEDICINE
Payer: COMMERCIAL

## 2024-01-22 VITALS
RESPIRATION RATE: 18 BRPM | HEART RATE: 68 BPM | DIASTOLIC BLOOD PRESSURE: 88 MMHG | HEIGHT: 59 IN | OXYGEN SATURATION: 99 % | TEMPERATURE: 98 F | SYSTOLIC BLOOD PRESSURE: 133 MMHG | WEIGHT: 147.93 LBS

## 2024-01-22 DIAGNOSIS — R00.2 PALPITATIONS: ICD-10-CM

## 2024-01-22 DIAGNOSIS — Z91.041 RADIOGRAPHIC DYE ALLERGY STATUS: ICD-10-CM

## 2024-01-22 DIAGNOSIS — Z91.040 LATEX ALLERGY STATUS: ICD-10-CM

## 2024-01-22 DIAGNOSIS — F41.9 ANXIETY DISORDER, UNSPECIFIED: ICD-10-CM

## 2024-01-22 DIAGNOSIS — R91.8 OTHER NONSPECIFIC ABNORMAL FINDING OF LUNG FIELD: ICD-10-CM

## 2024-01-22 DIAGNOSIS — F32.A DEPRESSION, UNSPECIFIED: ICD-10-CM

## 2024-01-22 DIAGNOSIS — Z98.891 HISTORY OF UTERINE SCAR FROM PREVIOUS SURGERY: Chronic | ICD-10-CM

## 2024-01-22 DIAGNOSIS — Z88.8 ALLERGY STATUS TO OTHER DRUGS, MEDICAMENTS AND BIOLOGICAL SUBSTANCES: ICD-10-CM

## 2024-01-22 DIAGNOSIS — R00.1 BRADYCARDIA, UNSPECIFIED: ICD-10-CM

## 2024-01-22 DIAGNOSIS — Z86.79 PERSONAL HISTORY OF OTHER DISEASES OF THE CIRCULATORY SYSTEM: ICD-10-CM

## 2024-01-22 LAB
ALBUMIN SERPL ELPH-MCNC: 4.3 G/DL — SIGNIFICANT CHANGE UP (ref 3.5–5.2)
ALP SERPL-CCNC: 61 U/L — SIGNIFICANT CHANGE UP (ref 30–115)
ALT FLD-CCNC: 14 U/L — SIGNIFICANT CHANGE UP (ref 0–41)
ANION GAP SERPL CALC-SCNC: 10 MMOL/L — SIGNIFICANT CHANGE UP (ref 7–14)
APAP SERPL-MCNC: <5 UG/ML — LOW (ref 10–30)
AST SERPL-CCNC: 17 U/L — SIGNIFICANT CHANGE UP (ref 0–41)
BASOPHILS # BLD AUTO: 0.05 K/UL — SIGNIFICANT CHANGE UP (ref 0–0.2)
BASOPHILS NFR BLD AUTO: 0.6 % — SIGNIFICANT CHANGE UP (ref 0–1)
BILIRUB SERPL-MCNC: 0.3 MG/DL — SIGNIFICANT CHANGE UP (ref 0.2–1.2)
BUN SERPL-MCNC: 13 MG/DL — SIGNIFICANT CHANGE UP (ref 10–20)
CALCIUM SERPL-MCNC: 10 MG/DL — SIGNIFICANT CHANGE UP (ref 8.4–10.5)
CHLORIDE SERPL-SCNC: 101 MMOL/L — SIGNIFICANT CHANGE UP (ref 98–110)
CO2 SERPL-SCNC: 28 MMOL/L — SIGNIFICANT CHANGE UP (ref 17–32)
CREAT SERPL-MCNC: 0.8 MG/DL — SIGNIFICANT CHANGE UP (ref 0.7–1.5)
EGFR: 85 ML/MIN/1.73M2 — SIGNIFICANT CHANGE UP
EOSINOPHIL # BLD AUTO: 0.1 K/UL — SIGNIFICANT CHANGE UP (ref 0–0.7)
EOSINOPHIL NFR BLD AUTO: 1.2 % — SIGNIFICANT CHANGE UP (ref 0–8)
ETHANOL SERPL-MCNC: <10 MG/DL — SIGNIFICANT CHANGE UP
GLUCOSE SERPL-MCNC: 90 MG/DL — SIGNIFICANT CHANGE UP (ref 70–99)
HCT VFR BLD CALC: 38.7 % — SIGNIFICANT CHANGE UP (ref 37–47)
HGB BLD-MCNC: 13.4 G/DL — SIGNIFICANT CHANGE UP (ref 12–16)
IMM GRANULOCYTES NFR BLD AUTO: 0.2 % — SIGNIFICANT CHANGE UP (ref 0.1–0.3)
LYMPHOCYTES # BLD AUTO: 2.08 K/UL — SIGNIFICANT CHANGE UP (ref 1.2–3.4)
LYMPHOCYTES # BLD AUTO: 25.4 % — SIGNIFICANT CHANGE UP (ref 20.5–51.1)
MAGNESIUM SERPL-MCNC: 2.1 MG/DL — SIGNIFICANT CHANGE UP (ref 1.8–2.4)
MCHC RBC-ENTMCNC: 32 PG — HIGH (ref 27–31)
MCHC RBC-ENTMCNC: 34.6 G/DL — SIGNIFICANT CHANGE UP (ref 32–37)
MCV RBC AUTO: 92.4 FL — SIGNIFICANT CHANGE UP (ref 81–99)
MONOCYTES # BLD AUTO: 0.67 K/UL — HIGH (ref 0.1–0.6)
MONOCYTES NFR BLD AUTO: 8.2 % — SIGNIFICANT CHANGE UP (ref 1.7–9.3)
NEUTROPHILS # BLD AUTO: 5.26 K/UL — SIGNIFICANT CHANGE UP (ref 1.4–6.5)
NEUTROPHILS NFR BLD AUTO: 64.4 % — SIGNIFICANT CHANGE UP (ref 42.2–75.2)
NRBC # BLD: 0 /100 WBCS — SIGNIFICANT CHANGE UP (ref 0–0)
PLATELET # BLD AUTO: 316 K/UL — SIGNIFICANT CHANGE UP (ref 130–400)
PMV BLD: 10.4 FL — SIGNIFICANT CHANGE UP (ref 7.4–10.4)
POTASSIUM SERPL-MCNC: 4.1 MMOL/L — SIGNIFICANT CHANGE UP (ref 3.5–5)
POTASSIUM SERPL-SCNC: 4.1 MMOL/L — SIGNIFICANT CHANGE UP (ref 3.5–5)
PROT SERPL-MCNC: 6.7 G/DL — SIGNIFICANT CHANGE UP (ref 6–8)
RBC # BLD: 4.19 M/UL — LOW (ref 4.2–5.4)
RBC # FLD: 11.5 % — SIGNIFICANT CHANGE UP (ref 11.5–14.5)
SALICYLATES SERPL-MCNC: <0.3 MG/DL — LOW (ref 4–30)
SODIUM SERPL-SCNC: 139 MMOL/L — SIGNIFICANT CHANGE UP (ref 135–146)
WBC # BLD: 8.18 K/UL — SIGNIFICANT CHANGE UP (ref 4.8–10.8)
WBC # FLD AUTO: 8.18 K/UL — SIGNIFICANT CHANGE UP (ref 4.8–10.8)

## 2024-01-22 PROCEDURE — 85025 COMPLETE CBC W/AUTO DIFF WBC: CPT

## 2024-01-22 PROCEDURE — 99285 EMERGENCY DEPT VISIT HI MDM: CPT

## 2024-01-22 PROCEDURE — 93005 ELECTROCARDIOGRAM TRACING: CPT

## 2024-01-22 PROCEDURE — 71045 X-RAY EXAM CHEST 1 VIEW: CPT | Mod: 26

## 2024-01-22 PROCEDURE — 71045 X-RAY EXAM CHEST 1 VIEW: CPT

## 2024-01-22 PROCEDURE — 80307 DRUG TEST PRSMV CHEM ANLYZR: CPT

## 2024-01-22 PROCEDURE — 80053 COMPREHEN METABOLIC PANEL: CPT

## 2024-01-22 PROCEDURE — 36415 COLL VENOUS BLD VENIPUNCTURE: CPT

## 2024-01-22 PROCEDURE — 83735 ASSAY OF MAGNESIUM: CPT

## 2024-01-22 PROCEDURE — 93010 ELECTROCARDIOGRAM REPORT: CPT

## 2024-01-22 PROCEDURE — 99285 EMERGENCY DEPT VISIT HI MDM: CPT | Mod: 25

## 2024-01-22 NOTE — ED PROVIDER NOTE - PATIENT PORTAL LINK FT
You can access the FollowMyHealth Patient Portal offered by [facility] by registering at the following website: [email or web address]. By joining [facility] FollowMyHealth portal, you will also be able to view your health information using other applications (apps) compatible with our system.

## 2024-01-22 NOTE — ED ADULT TRIAGE NOTE - CHIEF COMPLAINT QUOTE
PT stopped antidepressants ( Cymbalta)  9 weeks ago and has been intermittently taking then. PT states she has been feeling palpations. Reports worsening depression and axiety

## 2024-01-22 NOTE — ED PROVIDER NOTE - CLINICAL SUMMARY MEDICAL DECISION MAKING FREE TEXT BOX
EKG was interpreted by me.  It is sinus bradycardia with normal intervals and no acute changes.  Chest x-ray was obtained and film interpreted by me.  No pneumothorax, no opacity, labs are obtained.  Patient with normal appearance.  Spoke with patient about telepsych consultation however patient refuses to speak with them.  Patient has family support and is not suicidal.  Patient prefers to seek outpatient treatment.

## 2024-01-22 NOTE — ED PROVIDER NOTE - NSFOLLOWUPCLINICS_GEN_ALL_ED_FT
[facility]  [facility]  [address] [place] [id number]  Phone: [phone]  Fax:

## 2024-01-22 NOTE — ED PROVIDER NOTE - OBJECTIVE STATEMENT
this is a 57 yo female presents to ed for evaluation of palpitations. patient states she suffers from depression and stopped all her medications. patient states she is not feeling well. patient states she does not want to get up from bed. patient states that she is not showering.

## 2024-01-22 NOTE — ED PROVIDER NOTE - ATTENDING APP SHARED VISIT CONTRIBUTION OF CARE
58-year-old female past medical history of SVT, anxiety and depression presents for evaluation of palpitations, not feeling well.  Patient states that she has a history of depression and lately has been feeling numb and having trouble getting herself out of bed.  Patient states she had difficulty with antidepressants in the past and has been on multiple different medications.  Patient states that she recently started herself on Cymbalta but did not like the way she feels that she took herself off again.  No suicidal ideations,On exam patient in NAD, AAOx3, lungs CTA B/L, no murmur, no edema

## 2024-01-22 NOTE — ED PROVIDER NOTE - NSFOLLOWUPINSTRUCTIONS_ED_ALL_ED_FT
Please call your primary care doctor to schedule follow up appointment.   Please speak with your Nurse practitioner regarding medications and symptoms.

## 2024-01-22 NOTE — ED ADULT NURSE NOTE - NSFALLUNIVINTERV_ED_ALL_ED
Bed/Stretcher in lowest position, wheels locked, appropriate side rails in place/Call bell, personal items and telephone in reach/Instruct patient to call for assistance before getting out of bed/chair/stretcher/Non-slip footwear applied when patient is off stretcher/[place] to call system/Physically safe environment - no spills, clutter or unnecessary equipment/Purposeful proactive rounding/Room/bathroom lighting operational, light cord in reach

## 2024-01-23 NOTE — ED ADULT NURSE REASSESSMENT NOTE - NS ED NURSE REASSESS COMMENT FT1
Pt refuses to change into hospital clothing; refuses to talk to tele-psych. Pt refuses to change into hospital clothing; refuses to talk to tele-psych; denies SI/HI.

## 2024-01-31 ENCOUNTER — APPOINTMENT (OUTPATIENT)
Dept: PSYCHIATRY | Facility: CLINIC | Age: 59
End: 2024-01-31

## 2024-03-22 ENCOUNTER — APPOINTMENT (OUTPATIENT)
Dept: NEUROLOGY | Facility: CLINIC | Age: 59
End: 2024-03-22
Payer: COMMERCIAL

## 2024-03-22 VITALS — HEIGHT: 59 IN | BODY MASS INDEX: 29.84 KG/M2 | WEIGHT: 148 LBS

## 2024-03-22 DIAGNOSIS — R41.82 ALTERED MENTAL STATUS, UNSPECIFIED: ICD-10-CM

## 2024-03-22 PROCEDURE — 99204 OFFICE O/P NEW MOD 45 MIN: CPT

## 2024-03-22 PROCEDURE — G2211 COMPLEX E/M VISIT ADD ON: CPT

## 2024-03-22 RX ORDER — PRAVASTATIN SODIUM 20 MG/1
20 TABLET ORAL
Refills: 0 | Status: ACTIVE | COMMUNITY

## 2024-03-22 RX ORDER — FLUVOXAMINE MALEATE 50 MG/1
50 TABLET ORAL
Refills: 0 | Status: ACTIVE | COMMUNITY

## 2024-03-22 RX ORDER — ARIPIPRAZOLE 2 MG/1
2 TABLET ORAL
Refills: 0 | Status: ACTIVE | COMMUNITY

## 2024-03-22 RX ORDER — GABAPENTIN 300 MG
300 TABLET ORAL
Refills: 0 | Status: ACTIVE | COMMUNITY

## 2024-03-22 RX ORDER — METHYLPHENIDATE HYDROCHLORIDE 10 MG/1
10 CAPSULE, EXTENDED RELEASE ORAL
Refills: 0 | Status: ACTIVE | COMMUNITY

## 2024-03-22 NOTE — PHYSICAL EXAM
[Person] : oriented to person [Place] : oriented to place [Time] : oriented to time [Concentration Intact] : normal concentrating ability [Visual Intact] : visual attention was ~T not ~L decreased [Repeating Phrases] : no difficulty repeating a phrase [Writing A Sentence] : no difficulty writing a sentence [Naming Objects] : no difficulty naming common objects [Comprehension] : comprehension intact [Fluency] : fluency intact [Reading] : reading intact [Cranial Nerves Optic (II)] : visual acuity intact bilaterally,  visual fields full to confrontation, pupils equal round and reactive to light [Past History] : adequate knowledge of personal past history [Cranial Nerves Oculomotor (III)] : extraocular motion intact [Cranial Nerves Facial (VII)] : face symmetrical [Cranial Nerves Trigeminal (V)] : facial sensation intact symmetrically [Cranial Nerves Glossopharyngeal (IX)] : tongue and palate midline [Cranial Nerves Accessory (XI - Cranial And Spinal)] : head turning and shoulder shrug symmetric [Cranial Nerves Vestibulocochlear (VIII)] : hearing was intact bilaterally [Cranial Nerves Hypoglossal (XII)] : there was no tongue deviation with protrusion [Motor Strength] : muscle strength was normal in all four extremities [Motor Tone] : muscle tone was normal in all four extremities [Sensation Tactile Decrease] : light touch was intact [Abnormal Walk] : normal gait [No Muscle Atrophy] : normal bulk in all four extremities [Balance] : balance was intact [Past-pointing] : there was no past-pointing [Tremor] : no tremor present [Plantar Reflex Right Only] : normal on the right [2+] : Ankle jerk left 2+ [Plantar Reflex Left Only] : normal on the left [FreeTextEntry5] : large tongue, small chin, mallampatti score of 4

## 2024-03-22 NOTE — ASSESSMENT
[FreeTextEntry1] : Altered mental status-etiology unclear - MRI of the brain without gadolinium - EEG - Blood work -Though not the main cause but the presence of chronic pain as well as possibly undiagnosed and untreated obstructive sleep apnea worsen her symptoms  Chronic pain -Physical therapy - Trial of gabapentin - Follow-up with pain management if necessary  Obstructive sleep apnea - Home sleep study first if negative will send her for overnight sleep study - Patient advised to avoid sleeping pills until her sleep apnea is diagnosed and treated

## 2024-03-22 NOTE — HISTORY OF PRESENT ILLNESS
[FreeTextEntry1] : It's a pleasure to see Ms. VY AVILES In the office today. She is a 58 year -  old woman  who presents to the office today for the evaluation of altered mental status.  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  She reports that this change in her happen initially around October 2023 where she started having dizziness, worsening anxiety, depression, brain fog which further worsen after she had COVID in December 2023.  She followed up with her psychiatrist who could not explain her symptoms and wanted her to come to neurology for a workup.  She reports that even though she suffers from chronic pain, depression/anxiety, what she is feeling now feels completely different in characteristic and not just in severity.  Her  who came with her today also reports that this is not her normal self.  Patient spends a lot of her own money treatment programs, but they did not work.  She also is on multiple psych medications at this time  When asked about sleep patient reports not having good quality sleep due to chronic pain and reports not having had any dreams for a month.  Her  reports that she snores sometimes very loudly with episodes waking up gasping and choking for air but denies witnessed apneic episodes.  Her Lowden sleepiness score today is 10  Lastly, she suffers from chronic lumbar radiculopathy and her pain level has worsened in the last few months as well she has not started her recommended physical therapy in order for her to get a more updated MRI of the lumbar spine.  She was given gabapentin which seems to help but she only take it on as-needed basis.

## 2024-04-17 ENCOUNTER — APPOINTMENT (OUTPATIENT)
Dept: NEUROLOGY | Facility: CLINIC | Age: 59
End: 2024-04-17
Payer: COMMERCIAL

## 2024-04-17 VITALS — BODY MASS INDEX: 30.44 KG/M2 | WEIGHT: 151 LBS | HEIGHT: 59 IN

## 2024-04-17 VITALS — DIASTOLIC BLOOD PRESSURE: 61 MMHG | HEART RATE: 62 BPM | SYSTOLIC BLOOD PRESSURE: 102 MMHG

## 2024-04-17 DIAGNOSIS — G93.40 ENCEPHALOPATHY, UNSPECIFIED: ICD-10-CM

## 2024-04-17 PROCEDURE — G2211 COMPLEX E/M VISIT ADD ON: CPT

## 2024-04-17 PROCEDURE — 99214 OFFICE O/P EST MOD 30 MIN: CPT

## 2024-04-17 NOTE — ASSESSMENT
[FreeTextEntry1] : Encephalopathy/altered mental status-etiology unclear -MRI of the brain reordered to rule out intracranial pathology, not for evaluation of dementia -EEG as ordered -Blood work reviewed, no obvious pathology seen -Neuropsych evaluation.  Patient advised to call and make an appointment even if it is to be on the waiting list since she can always cancel it later if not needed.  Chronic cervical/lumbar radiculopathy - I went over with her the risk and benefit analysis of treating chronic neck pain and back pain, and advised that she should only consider surgery if pain management has failed. -Neurosurgery consultation as scheduled  Total clinician time spent  is  35 minutes including preparing to see the patient, obtaining and/or reviewing and confirming history, performing a medically necessary and appropriate examination, counseling and educating the patient and/or family, documenting clinical information in the HER and communicating and/or referring to other healthcare professionals.

## 2024-04-17 NOTE — HISTORY OF PRESENT ILLNESS
[FreeTextEntry1] : Ms. VY AVILES returns to the office for follow-up and her prior history and physical have been reviewed and she reports no change since last visit.  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  She was seen for severe acute mental status change of unclear etiology.  She was sent for MRI of the brain which was denied however the reason given was for dementia not what is being considered here.  She has not had the EEG yet but has had blood work that ruled out common causes of psychosis.  She reports that her symptom has not changed much since last visit but she has been able to return to work.  Even though she had no trouble dealing with patient, however she is still very forgetful and not performing her job that she has done well in the last 30 years.  While she was admitted in the psych program for a month, the psychiatrist did ask about her neurological workup including MRI of the brain that she never had.  She had tried to make appointment with neuropsych however the wait time was very long so she never made  She was thought to have obstructive sleep apnea, but her insurance plan does not cover even home sleep study.  Lastly she has chronic cervical/lumbar radiculopathy and is scheduled to see a neurosurgeon.  She is currently seen Dr. Washington, pain management however patient declined epidural injections and narcotics.  She is doing therapy and it does help with the pain.  She is more concerned about her spine condition getting worse causing disability later on.

## 2024-05-01 ENCOUNTER — APPOINTMENT (OUTPATIENT)
Dept: ORTHOPEDIC SURGERY | Facility: CLINIC | Age: 59
End: 2024-05-01
Payer: COMMERCIAL

## 2024-05-01 DIAGNOSIS — M54.12 RADICULOPATHY, CERVICAL REGION: ICD-10-CM

## 2024-05-01 PROCEDURE — 99204 OFFICE O/P NEW MOD 45 MIN: CPT

## 2024-05-01 NOTE — IMAGING
[de-identified] : TTP midline cervical spine and paraspinal musculature   Strength                                                                     Deltoid   Right: 5/5; Left: 5/5                      Biceps   Right: 5/5; Left: 5/5                   Triceps        Right: 5/5; Left: 5/5                                 Wrist Extensors     Right: 5/5; Left: 5/5 Finger Flexors     Right: 5/5; Left: 5/5 IO    Right: 5/5; Left: 5/5  Sensation C5   Right: 2/2; Left: 2/2 C6   Right: 2/2; Left: 2/2 C7   Right: 2/2; Left: 2/2 C8   Right: 2/2; Left: 2/2 T1   Right: 2/2; Left: 2/2  Reflexes Biceps   Right: 2+; Left 2+ Triceps   Right: 2+; Left 2+ Myers's  Right: Negative; L: Negative TTP midline spine and paraspinal musculature  Strength                                          Hip flexor   Right: 5/5; Left: 5/5                              Knee extensor     Right: 5/5; Left: 5/5                      Ankle dorsiflexion   Right: 5/5; Left: 5/5                   EHL           Right: 5/5; Left: 5/5                                 Ankle plantarflexion       Right: 5/5; Left: 5/5  Sensation L1   Right: 2/2; Left: 2/2 L2   Right: 2/2; Left: 2/2 L3   Right: 2/2; Left: 2/2 L4   Right: 2/2; Left: 2/2 L5   Right: 2/2; Left: 2/2 S1   Right: 2/2; Left: 2/2  Reflexes Patella   Right: 2+; Left 2+ Achilles   Right: 2+; Left 2+ Clonus  Right: absent; L: absent

## 2024-05-01 NOTE — HISTORY OF PRESENT ILLNESS
[de-identified] : 58-year-old female presents to cervical pain and lumbar pain.  The neck pain radiates down both of her arms with numbness and tingling in her hands.  She feels her balance is a little off and her hands are clumsy.  With regards to her lumbar spine she has pain that radiates down both of her legs with tingling in her toes on the right especially.  She walks her legs are heavy weak tired like they are full of cement.  The low back bothers the patient more than the neck at this time.  With regards to her spine she has had physical therapy in the past and she has done injections into the lumbar spine but down to the cervical spine she has an MRI of her lumbar spine from 2018 and she has an MRI of her cervical spine from April 2024.

## 2024-05-01 NOTE — DATA REVIEWED
[FreeTextEntry1] : I reviewed the patient's MRI of her lumbar spine.  The patient has severe spinal stenosis at L4-5 with spondylolisthesis.  The patient cervical spine she has focal central disc herniation at C4-5 without any foraminal stenosis at C5-6 and C6-7 the patient significant foraminal stenosis bilaterally.

## 2024-05-01 NOTE — DISCUSSION/SUMMARY
[de-identified] : 58-year-old female with symptoms of neurogenic claudication lumbar radiculopathy and cervical radiculopathy.  I discussed the patient if she fails conservative care we do surgery for both her cervical and lumbar spine depending on which 1 bothers her more with her as her cervical spine she would likely need a C5-7 ACDF versus a C4 7 ACDF.  With regards to her lumbar spine she will likely need an L4-5 laminectomy and instrumented fusion.  However her most recent MRI of her lumbar spine is from 2018 when he had a repeat 1.  I also advised her to keep up with physical therapy and maybe consider trying more injections.  We did discuss that there is no emergency or rush on the surgery and is only last resort if she fails conservative care

## 2024-05-10 ENCOUNTER — NON-APPOINTMENT (OUTPATIENT)
Age: 59
End: 2024-05-10

## 2024-06-17 ENCOUNTER — APPOINTMENT (OUTPATIENT)
Dept: NEUROLOGY | Facility: CLINIC | Age: 59
End: 2024-06-17
Payer: COMMERCIAL

## 2024-06-17 PROCEDURE — 95816 EEG AWAKE AND DROWSY: CPT

## 2024-06-19 ENCOUNTER — APPOINTMENT (OUTPATIENT)
Dept: ORTHOPEDIC SURGERY | Facility: CLINIC | Age: 59
End: 2024-06-19
Payer: COMMERCIAL

## 2024-06-19 DIAGNOSIS — M54.16 RADICULOPATHY, LUMBAR REGION: ICD-10-CM

## 2024-06-19 PROCEDURE — 99214 OFFICE O/P EST MOD 30 MIN: CPT

## 2024-06-19 NOTE — HISTORY OF PRESENT ILLNESS
[de-identified] : 59-year-old female presents to me with continued low back pain radiating down her legs.  She has tingling in her toes.  When she walks her legs are heavy weak tired like they are full of cement.  She has done over 6 weeks of physical therapy.  She has since stopped taking naproxen because of her stomach pains.  She can she takes an ibuprofen.

## 2024-06-19 NOTE — DISCUSSION/SUMMARY
[de-identified] : 59-year-old female with lumbar radiculopathy and neurogenic claudication secondary to L4-5 spondylolisthesis.  I am ordering an MRI of her lumbar spine because she has failed over 6 weeks of physical therapy and anti-inflammatory treatments.  Follow-up after the MRI of the lumbar spine is complete.  Continue PT in the meantime.

## 2024-06-19 NOTE — IMAGING
[de-identified] : TTP midline spine and paraspinal musculature  Strength                                          Hip flexor   Right: 5/5; Left: 5/5                              Knee extensor     Right: 5/5; Left: 5/5                      Ankle dorsiflexion   Right: 5/5; Left: 5/5                   EHL           Right: 5/5; Left: 5/5                                 Ankle plantarflexion       Right: 5/5; Left: 5/5  Sensation L1   Right: 2/2; Left: 2/2 L2   Right: 2/2; Left: 2/2 L3   Right: 2/2; Left: 2/2 L4   Right: 2/2; Left: 2/2 L5   Right: 2/2; Left: 2/2 S1   Right: 2/2; Left: 2/2  Reflexes Patella   Right: 2+; Left 2+ Achilles   Right: 2+; Left 2+ Clonus  Right: absent; L: absent

## 2024-06-26 ENCOUNTER — APPOINTMENT (OUTPATIENT)
Dept: NEUROLOGY | Facility: CLINIC | Age: 59
End: 2024-06-26

## 2024-07-24 ENCOUNTER — APPOINTMENT (OUTPATIENT)
Dept: ORTHOPEDIC SURGERY | Facility: CLINIC | Age: 59
End: 2024-07-24

## 2024-10-11 NOTE — ED PROVIDER NOTE - NS ED MD DISPO DISCHARGE CCDA
Vciki Dillard is a 49 year old female presenting with follow up    Concerns: how to pursue disability  Also Bladder concerns    Denies known Latex allergy or symptoms of Latex sensitivity  Medications verified, no changes  .  Social History     Tobacco Use   Smoking Status Former    Current packs/day: 0.00    Average packs/day: 0.5 packs/day for 10.0 years (5.0 ttl pk-yrs)    Types: Cigarettes    Start date: 5/10/1999    Quit date: 5/10/2009    Years since quitting: 15.4   Smokeless Tobacco Never            Patient/Caregiver provided printed discharge information.

## 2025-09-12 ENCOUNTER — APPOINTMENT (OUTPATIENT)
Dept: NEUROLOGY | Facility: CLINIC | Age: 60
End: 2025-09-12